# Patient Record
Sex: MALE | Race: WHITE | Employment: FULL TIME | URBAN - METROPOLITAN AREA
[De-identification: names, ages, dates, MRNs, and addresses within clinical notes are randomized per-mention and may not be internally consistent; named-entity substitution may affect disease eponyms.]

---

## 2023-04-07 NOTE — TELEPHONE ENCOUNTER
DIAGNOSIS: (L) MCL/ Dr. Guilherme Martin@ [facility]/ Medicare & BCBS/ MRI & X-RAYS     APPOINTMENT DATE: 4.17.23   NOTES STATUS DETAILS   OFFICE NOTE from other specialist Internal [facility]:  4.5.23 Guilherme Martin MD  3.13.23    3.20.23 Ximena Francois, ERIC        3.7.23 Yamel Correa, APRN-CNP     DISCHARGE REPORT from the ER Internal 3.3.23 Carlene Martinez MD  -[facility]     MEDICATION LIST Care Everywhere    MRI PACS [facility]:  3.7.23 L knee     XRAYS (IMAGES & REPORTS) PACS [facility]:  3.3.23 L knee       Action April 7, 2023 9:31 AM BH   Action Taken Faxed request xray images to [facility]. Fax [phone]  11:07 AM -- RECEIVED AND RESOLVED IMAGES IN PACS

## 2023-04-17 ENCOUNTER — OFFICE VISIT (OUTPATIENT)
Dept: ORTHOPEDICS | Facility: CLINIC | Age: 68
End: 2023-04-17
Payer: MEDICARE

## 2023-04-17 ENCOUNTER — DOCUMENTATION ONLY (OUTPATIENT)
Dept: ORTHOPEDICS | Facility: CLINIC | Age: 68
End: 2023-04-17

## 2023-04-17 ENCOUNTER — PRE VISIT (OUTPATIENT)
Dept: ORTHOPEDICS | Facility: CLINIC | Age: 68
End: 2023-04-17

## 2023-04-17 VITALS — HEIGHT: 66 IN | WEIGHT: 170 LBS | BODY MASS INDEX: 27.32 KG/M2

## 2023-04-17 DIAGNOSIS — M25.562 CHRONIC PAIN OF LEFT KNEE: Primary | ICD-10-CM

## 2023-04-17 DIAGNOSIS — G89.29 CHRONIC PAIN OF LEFT KNEE: Primary | ICD-10-CM

## 2023-04-17 PROCEDURE — 99204 OFFICE O/P NEW MOD 45 MIN: CPT | Mod: GC | Performed by: ORTHOPAEDIC SURGERY

## 2023-04-17 RX ORDER — LISINOPRIL 20 MG/1
20 TABLET ORAL
COMMUNITY
Start: 2022-12-19

## 2023-04-17 RX ORDER — ATORVASTATIN CALCIUM 20 MG/1
20 TABLET, FILM COATED ORAL
COMMUNITY
Start: 2022-12-19 | End: 2023-12-24

## 2023-04-17 ASSESSMENT — ENCOUNTER SYMPTOMS
BACK PAIN: 0
MUSCLE WEAKNESS: 0
JOINT SWELLING: 0
NECK PAIN: 0
STIFFNESS: 0
MUSCLE CRAMPS: 0
ARTHRALGIAS: 0
MYALGIAS: 0

## 2023-04-17 NOTE — PROGRESS NOTES
Patient seen and examined with the resident. I also personally reviewed the images and interpreted the imaging myself.     Assesment: High grade Left MCL tear, proximal    PCL tear    Intact ACL on examination    Plan: I had a long discussion today with the patient.  He is a very active outdoor individual and I think he probably would benefit from surgery.  He still has significant instability about his knee.  The surgical plan will be examination under anesthesia, knee arthroscopy, meniscectomy, medial collateral ligament reconstruction with allograft, posterior cruciate ligament reconstruction with allograft.    I discussed with him the pros cons risk and benefits of surgery.  We discussed the expected course of recovery and alternative treatment options.  He understands that its not a requirement though I would not expect that his knee will continue to improve any longer.  He also needs to understand that there is a risk of stiffness with surgery which I Albertina put it 10 to 15%.  I agree with history, physical and imaging as well as the assessment and plan as detailed by Dr. Crain.
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
## 2023-04-17 NOTE — PROGRESS NOTES
Procedure: MCL, PCL repair and possible ACL and meniscus surgery  Facility: [facility]  Length: 120 minutes  Anesthesia: Choice  Post-op appointments needed: 2 weeks provider only, 6 weeks with provider only.  Surgery packet/instructions given to patient?  Yes     Pre-Operative Teaching Flowsheet     Person(s) involved in teaching: Patient     Motivation Level:  Receptive (willing/able to accept information) and asks appropriate questions where applicable: Yes  Any cultural factors/Zoroastrian beliefs that may influence understanding or compliance? No     Patient demonstrates understanding of the following:  Pre-operative planning, including the necessary appointments and preparation needed prior to surgery: Yes  Which situations necessitate calling provider and whom to contact: Yes  Pain management techniques pre and post op: Yes  How, and when, to access community resources: Yes    Who will drive and stay/ with patient after surgery: Friends  Pre-op exam Brooklyn Abdulaziz  PT to be completed at [facility]         Additional Teaching Concerns Addressed:   Post-operative living arrangements and necessary adaptations to living environment.     Instructional Materials Used/Given: Yes, pre-op packet given including forms for Your surgery day, medications to stop taking before surgery, preparing for surgery, Covid-19 testing, showering before surgery, Stop light tool introduced, Opioid pain medication guideline, pre-op physical form, and map  Patient expressed understanding of all forms given, questions were answered and will review in more detail at home.     Time spent with patient: 20 minutes.

## 2023-04-18 ENCOUNTER — TELEPHONE (OUTPATIENT)
Dept: ORTHOPEDICS | Facility: CLINIC | Age: 68
End: 2023-04-18
Payer: MEDICARE

## 2023-04-18 ENCOUNTER — TELEPHONE (OUTPATIENT)
Dept: ORTHOPEDICS | Facility: CLINIC | Age: 68
End: 2023-04-18

## 2023-04-18 NOTE — TELEPHONE ENCOUNTER
Returned call to patient to schedule surgery with Dr Maldonado. I left him my direct number to call back when he is able. [phone]

## 2023-04-19 ENCOUNTER — TELEPHONE (OUTPATIENT)
Dept: ORTHOPEDICS | Facility: CLINIC | Age: 68
End: 2023-04-19
Payer: MEDICARE

## 2023-04-19 PROBLEM — G89.29 CHRONIC PAIN OF LEFT KNEE: Status: ACTIVE | Noted: 2023-04-19

## 2023-04-19 PROBLEM — M25.562 CHRONIC PAIN OF LEFT KNEE: Status: ACTIVE | Noted: 2023-04-19

## 2023-04-19 NOTE — TELEPHONE ENCOUNTER
Patient is scheduled for surgery with Dr. Maldonado    Spoke with: Patient    Date of Surgery: 5/11/23    Location: ASC    Post ops:1 & 6 weeks    Pre op with Provider: Complete    H&P: Scheduled with PCP    Additional imaging/appointments: N/A    Surgery packet: Received in clinic     Additional comments: N/A        Brandy Saucedo MA on 4/19/2023 at 11:41 AM

## 2023-04-20 DIAGNOSIS — G89.29 CHRONIC PAIN OF LEFT KNEE: Primary | ICD-10-CM

## 2023-04-20 DIAGNOSIS — M25.562 CHRONIC PAIN OF LEFT KNEE: Primary | ICD-10-CM

## 2023-04-26 ENCOUNTER — TELEPHONE (OUTPATIENT)
Dept: ORTHOPEDICS | Facility: CLINIC | Age: 68
End: 2023-04-26
Payer: MEDICARE

## 2023-04-26 NOTE — TELEPHONE ENCOUNTER
I received a call from [facility] in [place] asking for the PT protocols for this patient to be faxed over. I faxed the PT protocol for multiple ligament knee reconstruction over and called to let them know that I had faxed it. They confirmed that they received the fax.    Fredi Kang, EMT

## 2023-05-06 ENCOUNTER — HEALTH MAINTENANCE LETTER (OUTPATIENT)
Age: 68
End: 2023-05-06

## 2023-05-08 ENCOUNTER — ANESTHESIA EVENT (OUTPATIENT)
Dept: SURGERY | Facility: AMBULATORY SURGERY CENTER | Age: 68
End: 2023-05-08
Payer: MEDICARE

## 2023-05-08 RX ORDER — OXYCODONE HYDROCHLORIDE 5 MG/1
10 TABLET ORAL
Status: CANCELLED | OUTPATIENT
Start: 2023-05-08

## 2023-05-08 NOTE — ANESTHESIA PREPROCEDURE EVALUATION
Anesthesia Pre-Procedure Evaluation    Patient: Avery Anderson   MRN: [id number] : 1955        Procedure : Procedure(s):  left knee examination under anesthesia left knee posterior cruciate ligament reconstruction with allograft  medial collateral ligament reconstruction with allograft left knee  meniscus surgery left knee          No past medical history on file.   No past surgical history on file.   Allergies   Allergen Reactions     Penicillin G Rash      Social History     Tobacco Use     Smoking status: Not on file     Smokeless tobacco: Not on file   Substance Use Topics     Alcohol use: Not on file      Wt Readings from Last 1 Encounters:   23 77.1 kg (170 lb)        Anesthesia Evaluation            ROS/MED HX  ENT/Pulmonary:  - neg pulmonary ROS     Neurologic:  - neg neurologic ROS     Cardiovascular:     (+) hypertension-----    METS/Exercise Tolerance:     Hematologic:     (+) anemia,     Musculoskeletal:       GI/Hepatic:  - neg GI/hepatic ROS     Renal/Genitourinary:  - neg Renal ROS     Endo:  - neg endo ROS     Psychiatric/Substance Use:  - neg psychiatric ROS     Infectious Disease:  - neg infectious disease ROS     Malignancy:  - neg malignancy ROS     Other:  - neg other ROS          Physical Exam    Airway        Mallampati: II   TM distance: > 3 FB   Neck ROM: full   Mouth opening: > 3 cm    Respiratory Devices and Support         Dental           Cardiovascular   cardiovascular exam normal          Pulmonary   pulmonary exam normal                OUTSIDE LABS:  CBC: No results found for: WBC, HGB, HCT, PLT  BMP: No results found for: NA, POTASSIUM, CHLORIDE, CO2, BUN, CR, GLC  COAGS: No results found for: PTT, INR, FIBR  POC: No results found for: BGM, HCG, HCGS  HEPATIC: No results found for: ALBUMIN, PROTTOTAL, ALT, AST, GGT, ALKPHOS, BILITOTAL, BILIDIRECT, JALIL  OTHER: No results found for: PH, LACT, A1C, YOSELIN, PHOS, MAG, LIPASE, AMYLASE, TSH, T4, T3, CRP, SED    Anesthesia  Plan    ASA Status:  2   NPO Status:  NPO Appropriate    Anesthesia Type: General.     - Airway: LMA   Induction: Intravenous, Propofol.   Maintenance: TIVA.        Consents    Anesthesia Plan(s) and associated risks, benefits, and realistic alternatives discussed. Questions answered and patient/representative(s) expressed understanding.    - Discussed:     - Discussed with:  Patient      - Extended Intubation/Ventilatory Support Discussed: No.      - Patient is DNR/DNI Status: No    Use of blood products discussed: No .     Postoperative Care    Pain management: Multi-modal analgesia, IV analgesics, Oral pain medications, Peripheral nerve block (Single Shot).   PONV prophylaxis: Ondansetron (or other 5HT-3), Dexamethasone or Solumedrol, Background Propofol Infusion     Comments:                Joe Zambrano, DO

## 2023-05-11 ENCOUNTER — ANESTHESIA (OUTPATIENT)
Dept: SURGERY | Facility: AMBULATORY SURGERY CENTER | Age: 68
End: 2023-05-11
Payer: MEDICARE

## 2023-05-11 ENCOUNTER — HOSPITAL ENCOUNTER (OUTPATIENT)
Facility: AMBULATORY SURGERY CENTER | Age: 68
Discharge: HOME | End: 2023-05-11
Attending: ORTHOPAEDIC SURGERY
Payer: MEDICARE

## 2023-05-11 ENCOUNTER — ANCILLARY PROCEDURE (OUTPATIENT)
Dept: RADIOLOGY | Facility: AMBULATORY SURGERY CENTER | Age: 68
End: 2023-05-11
Attending: ORTHOPAEDIC SURGERY
Payer: MEDICARE

## 2023-05-11 VITALS
RESPIRATION RATE: 16 BRPM | HEIGHT: 66 IN | OXYGEN SATURATION: 94 % | WEIGHT: 170 LBS | HEART RATE: 65 BPM | SYSTOLIC BLOOD PRESSURE: 128 MMHG | TEMPERATURE: 97.3 F | DIASTOLIC BLOOD PRESSURE: 78 MMHG | BODY MASS INDEX: 27.32 KG/M2

## 2023-05-11 DIAGNOSIS — G89.29 CHRONIC PAIN OF LEFT KNEE: ICD-10-CM

## 2023-05-11 DIAGNOSIS — M25.562 CHRONIC PAIN OF LEFT KNEE: ICD-10-CM

## 2023-05-11 PROCEDURE — C9290 INJ, BUPIVACAINE LIPOSOME: HCPCS

## 2023-05-11 PROCEDURE — 27427 RECONSTRUCTION KNEE: CPT | Mod: 59,LT

## 2023-05-11 PROCEDURE — 29881 ARTHRS KNE SRG MNISECTMY M/L: CPT | Mod: LT

## 2023-05-11 PROCEDURE — 29889 ARTHRS AID PCL RPR/AGMNTJ: CPT | Mod: LT | Performed by: ORTHOPAEDIC SURGERY

## 2023-05-11 PROCEDURE — 29889 ARTHRS AID PCL RPR/AGMNTJ: CPT | Mod: LT

## 2023-05-11 PROCEDURE — C1713 ANCHOR/SCREW BN/BN,TIS/BN: HCPCS

## 2023-05-11 PROCEDURE — C1762 CONN TISS, HUMAN(INC FASCIA): HCPCS | Mod: ZZSP

## 2023-05-11 PROCEDURE — 27427 RECONSTRUCTION KNEE: CPT | Mod: 59 | Performed by: ORTHOPAEDIC SURGERY

## 2023-05-11 PROCEDURE — 29881 ARTHRS KNE SRG MNISECTMY M/L: CPT | Mod: LT | Performed by: ORTHOPAEDIC SURGERY

## 2023-05-11 DEVICE — BIO-COMP SWIVELOCK C, CLD 5.5X19.1MM
Type: IMPLANTABLE DEVICE | Site: KNEE | Status: FUNCTIONAL
Brand: ARTHREX®

## 2023-05-11 DEVICE — IMPLANTABLE DEVICE: Type: IMPLANTABLE DEVICE | Site: KNEE | Status: FUNCTIONAL

## 2023-05-11 DEVICE — GRAFT TENDON SEMITENDINOSUS 26CM 430355: Type: IMPLANTABLE DEVICE | Site: KNEE | Status: FUNCTIONAL

## 2023-05-11 DEVICE — TIGHTROPE ® II RT WITH DEPLOYING SUTURE
Type: IMPLANTABLE DEVICE | Site: KNEE | Status: FUNCTIONAL
Brand: ARTHREX®

## 2023-05-11 DEVICE — BI-CORTICAL POST
Type: IMPLANTABLE DEVICE | Site: KNEE | Status: FUNCTIONAL
Brand: ARTHREX®

## 2023-05-11 DEVICE — TIGHTROPE ABS BUTTON ROUND 11MM CONCAVE
Type: IMPLANTABLE DEVICE | Site: KNEE | Status: FUNCTIONAL
Brand: ARTHREX®

## 2023-05-11 RX ORDER — NALOXONE HYDROCHLORIDE 0.4 MG/ML
0.4 INJECTION, SOLUTION INTRAMUSCULAR; INTRAVENOUS; SUBCUTANEOUS
Status: DISCONTINUED | OUTPATIENT
Start: 2023-05-11 | End: 2023-05-12 | Stop reason: HOSPADM

## 2023-05-11 RX ORDER — PROPOFOL 10 MG/ML
INJECTION, EMULSION INTRAVENOUS CONTINUOUS PRN
Status: DISCONTINUED | OUTPATIENT
Start: 2023-05-11 | End: 2023-05-11

## 2023-05-11 RX ORDER — ACETAMINOPHEN 325 MG/1
975 TABLET ORAL ONCE
Status: COMPLETED | OUTPATIENT
Start: 2023-05-11 | End: 2023-05-11

## 2023-05-11 RX ORDER — FLUMAZENIL 0.1 MG/ML
0.2 INJECTION, SOLUTION INTRAVENOUS
Status: DISCONTINUED | OUTPATIENT
Start: 2023-05-11 | End: 2023-05-12 | Stop reason: HOSPADM

## 2023-05-11 RX ORDER — LIDOCAINE HYDROCHLORIDE 20 MG/ML
INJECTION, SOLUTION INFILTRATION; PERINEURAL PRN
Status: DISCONTINUED | OUTPATIENT
Start: 2023-05-11 | End: 2023-05-11

## 2023-05-11 RX ORDER — CEFAZOLIN SODIUM 2 G/50ML
2 SOLUTION INTRAVENOUS SEE ADMIN INSTRUCTIONS
Status: DISCONTINUED | OUTPATIENT
Start: 2023-05-11 | End: 2023-05-12 | Stop reason: HOSPADM

## 2023-05-11 RX ORDER — FENTANYL CITRATE 50 UG/ML
25 INJECTION, SOLUTION INTRAMUSCULAR; INTRAVENOUS EVERY 5 MIN PRN
Status: DISCONTINUED | OUTPATIENT
Start: 2023-05-11 | End: 2023-05-12 | Stop reason: HOSPADM

## 2023-05-11 RX ORDER — CEFAZOLIN SODIUM 2 G/50ML
2 SOLUTION INTRAVENOUS
Status: COMPLETED | OUTPATIENT
Start: 2023-05-11 | End: 2023-05-11

## 2023-05-11 RX ORDER — NALOXONE HYDROCHLORIDE 0.4 MG/ML
0.2 INJECTION, SOLUTION INTRAMUSCULAR; INTRAVENOUS; SUBCUTANEOUS
Status: DISCONTINUED | OUTPATIENT
Start: 2023-05-11 | End: 2023-05-12 | Stop reason: HOSPADM

## 2023-05-11 RX ORDER — HYDROXYZINE HYDROCHLORIDE 25 MG/1
25 TABLET, FILM COATED ORAL
Status: DISCONTINUED | OUTPATIENT
Start: 2023-05-11 | End: 2023-05-12 | Stop reason: HOSPADM

## 2023-05-11 RX ORDER — PROPOFOL 10 MG/ML
INJECTION, EMULSION INTRAVENOUS PRN
Status: DISCONTINUED | OUTPATIENT
Start: 2023-05-11 | End: 2023-05-11

## 2023-05-11 RX ORDER — HYDROMORPHONE HYDROCHLORIDE 1 MG/ML
0.4 INJECTION, SOLUTION INTRAMUSCULAR; INTRAVENOUS; SUBCUTANEOUS EVERY 5 MIN PRN
Status: DISCONTINUED | OUTPATIENT
Start: 2023-05-11 | End: 2023-05-12 | Stop reason: HOSPADM

## 2023-05-11 RX ORDER — ACETAMINOPHEN 325 MG/1
975 TABLET ORAL ONCE
Status: DISCONTINUED | OUTPATIENT
Start: 2023-05-11 | End: 2023-05-12 | Stop reason: HOSPADM

## 2023-05-11 RX ORDER — ONDANSETRON 2 MG/ML
INJECTION INTRAMUSCULAR; INTRAVENOUS PRN
Status: DISCONTINUED | OUTPATIENT
Start: 2023-05-11 | End: 2023-05-11

## 2023-05-11 RX ORDER — ONDANSETRON 2 MG/ML
4 INJECTION INTRAMUSCULAR; INTRAVENOUS EVERY 30 MIN PRN
Status: DISCONTINUED | OUTPATIENT
Start: 2023-05-11 | End: 2023-05-12 | Stop reason: HOSPADM

## 2023-05-11 RX ORDER — DEXAMETHASONE SODIUM PHOSPHATE 4 MG/ML
INJECTION, SOLUTION INTRA-ARTICULAR; INTRALESIONAL; INTRAMUSCULAR; INTRAVENOUS; SOFT TISSUE PRN
Status: DISCONTINUED | OUTPATIENT
Start: 2023-05-11 | End: 2023-05-11

## 2023-05-11 RX ORDER — LIDOCAINE 40 MG/G
CREAM TOPICAL
Status: DISCONTINUED | OUTPATIENT
Start: 2023-05-11 | End: 2023-05-12 | Stop reason: HOSPADM

## 2023-05-11 RX ORDER — SODIUM CHLORIDE, SODIUM LACTATE, POTASSIUM CHLORIDE, CALCIUM CHLORIDE 600; 310; 30; 20 MG/100ML; MG/100ML; MG/100ML; MG/100ML
INJECTION, SOLUTION INTRAVENOUS CONTINUOUS
Status: DISCONTINUED | OUTPATIENT
Start: 2023-05-11 | End: 2023-05-12 | Stop reason: HOSPADM

## 2023-05-11 RX ORDER — OXYCODONE HYDROCHLORIDE 5 MG/1
5-10 TABLET ORAL EVERY 4 HOURS PRN
Qty: 20 TABLET | Refills: 0 | Status: SHIPPED | OUTPATIENT
Start: 2023-05-11

## 2023-05-11 RX ORDER — HYDROMORPHONE HYDROCHLORIDE 1 MG/ML
0.2 INJECTION, SOLUTION INTRAMUSCULAR; INTRAVENOUS; SUBCUTANEOUS EVERY 5 MIN PRN
Status: DISCONTINUED | OUTPATIENT
Start: 2023-05-11 | End: 2023-05-12 | Stop reason: HOSPADM

## 2023-05-11 RX ORDER — ACETAMINOPHEN 325 MG/1
650 TABLET ORAL EVERY 4 HOURS PRN
Qty: 50 TABLET | Refills: 0 | Status: SHIPPED | OUTPATIENT
Start: 2023-05-11

## 2023-05-11 RX ORDER — FENTANYL CITRATE 50 UG/ML
25-50 INJECTION, SOLUTION INTRAMUSCULAR; INTRAVENOUS
Status: DISCONTINUED | OUTPATIENT
Start: 2023-05-11 | End: 2023-05-12 | Stop reason: HOSPADM

## 2023-05-11 RX ORDER — ONDANSETRON 4 MG/1
4 TABLET, ORALLY DISINTEGRATING ORAL EVERY 30 MIN PRN
Status: DISCONTINUED | OUTPATIENT
Start: 2023-05-11 | End: 2023-05-12 | Stop reason: HOSPADM

## 2023-05-11 RX ORDER — OXYCODONE HYDROCHLORIDE 5 MG/1
5 TABLET ORAL
Status: COMPLETED | OUTPATIENT
Start: 2023-05-11 | End: 2023-05-11

## 2023-05-11 RX ORDER — BUPIVACAINE HYDROCHLORIDE AND EPINEPHRINE 2.5; 5 MG/ML; UG/ML
INJECTION, SOLUTION INFILTRATION; PERINEURAL PRN
Status: DISCONTINUED | OUTPATIENT
Start: 2023-05-11 | End: 2023-05-11 | Stop reason: HOSPADM

## 2023-05-11 RX ORDER — BUPIVACAINE HYDROCHLORIDE 2.5 MG/ML
INJECTION, SOLUTION EPIDURAL; INFILTRATION; INTRACAUDAL
Status: COMPLETED | OUTPATIENT
Start: 2023-05-11 | End: 2023-05-11

## 2023-05-11 RX ORDER — GLYCOPYRROLATE 0.2 MG/ML
INJECTION, SOLUTION INTRAMUSCULAR; INTRAVENOUS PRN
Status: DISCONTINUED | OUTPATIENT
Start: 2023-05-11 | End: 2023-05-11

## 2023-05-11 RX ORDER — ONDANSETRON 4 MG/1
4 TABLET, ORALLY DISINTEGRATING ORAL EVERY 8 HOURS PRN
Qty: 4 TABLET | Refills: 0 | Status: SHIPPED | OUTPATIENT
Start: 2023-05-11

## 2023-05-11 RX ORDER — FENTANYL CITRATE 50 UG/ML
50 INJECTION, SOLUTION INTRAMUSCULAR; INTRAVENOUS EVERY 5 MIN PRN
Status: DISCONTINUED | OUTPATIENT
Start: 2023-05-11 | End: 2023-05-12 | Stop reason: HOSPADM

## 2023-05-11 RX ORDER — LABETALOL HYDROCHLORIDE 5 MG/ML
10 INJECTION, SOLUTION INTRAVENOUS
Status: DISCONTINUED | OUTPATIENT
Start: 2023-05-11 | End: 2023-05-12 | Stop reason: HOSPADM

## 2023-05-11 RX ORDER — ONDANSETRON 4 MG/1
4 TABLET, ORALLY DISINTEGRATING ORAL
Status: DISCONTINUED | OUTPATIENT
Start: 2023-05-11 | End: 2023-05-12 | Stop reason: HOSPADM

## 2023-05-11 RX ORDER — KETOROLAC TROMETHAMINE 30 MG/ML
INJECTION, SOLUTION INTRAMUSCULAR; INTRAVENOUS PRN
Status: DISCONTINUED | OUTPATIENT
Start: 2023-05-11 | End: 2023-05-11

## 2023-05-11 RX ORDER — AMOXICILLIN 250 MG
1-2 CAPSULE ORAL 2 TIMES DAILY
Qty: 30 TABLET | Refills: 0 | Status: SHIPPED | OUTPATIENT
Start: 2023-05-11

## 2023-05-11 RX ORDER — OXYCODONE HYDROCHLORIDE 5 MG/1
5 TABLET ORAL
Status: DISCONTINUED | OUTPATIENT
Start: 2023-05-11 | End: 2023-05-12 | Stop reason: HOSPADM

## 2023-05-11 RX ORDER — DEXMEDETOMIDINE HYDROCHLORIDE 4 UG/ML
INJECTION, SOLUTION INTRAVENOUS PRN
Status: DISCONTINUED | OUTPATIENT
Start: 2023-05-11 | End: 2023-05-11

## 2023-05-11 RX ORDER — ACETAMINOPHEN 325 MG/1
650 TABLET ORAL
Status: DISCONTINUED | OUTPATIENT
Start: 2023-05-11 | End: 2023-05-12 | Stop reason: HOSPADM

## 2023-05-11 RX ADMIN — PROPOFOL 150 MCG/KG/MIN: 10 INJECTION, EMULSION INTRAVENOUS at 07:21

## 2023-05-11 RX ADMIN — GLYCOPYRROLATE 0.2 MG: 0.2 INJECTION, SOLUTION INTRAMUSCULAR; INTRAVENOUS at 08:32

## 2023-05-11 RX ADMIN — DEXAMETHASONE SODIUM PHOSPHATE 4 MG: 4 INJECTION, SOLUTION INTRA-ARTICULAR; INTRALESIONAL; INTRAMUSCULAR; INTRAVENOUS; SOFT TISSUE at 07:21

## 2023-05-11 RX ADMIN — KETOROLAC TROMETHAMINE 30 MG: 30 INJECTION, SOLUTION INTRAMUSCULAR; INTRAVENOUS at 10:07

## 2023-05-11 RX ADMIN — Medication 0.5 MG: at 07:50

## 2023-05-11 RX ADMIN — Medication 100 MCG: at 08:58

## 2023-05-11 RX ADMIN — Medication 100 MCG: at 07:58

## 2023-05-11 RX ADMIN — CEFAZOLIN SODIUM 2 G: 2 SOLUTION INTRAVENOUS at 07:29

## 2023-05-11 RX ADMIN — Medication 100 MCG: at 07:46

## 2023-05-11 RX ADMIN — Medication 100 MCG: at 08:42

## 2023-05-11 RX ADMIN — Medication 100 MCG: at 08:40

## 2023-05-11 RX ADMIN — DEXMEDETOMIDINE HYDROCHLORIDE 4 MCG: 4 INJECTION, SOLUTION INTRAVENOUS at 08:34

## 2023-05-11 RX ADMIN — Medication 100 MCG: at 08:27

## 2023-05-11 RX ADMIN — SODIUM CHLORIDE, SODIUM LACTATE, POTASSIUM CHLORIDE, CALCIUM CHLORIDE: 600; 310; 30; 20 INJECTION, SOLUTION INTRAVENOUS at 08:35

## 2023-05-11 RX ADMIN — FENTANYL CITRATE 50 MCG: 50 INJECTION, SOLUTION INTRAMUSCULAR; INTRAVENOUS at 06:51

## 2023-05-11 RX ADMIN — GLYCOPYRROLATE 0.2 MG: 0.2 INJECTION, SOLUTION INTRAMUSCULAR; INTRAVENOUS at 07:30

## 2023-05-11 RX ADMIN — Medication 100 MCG: at 08:09

## 2023-05-11 RX ADMIN — ACETAMINOPHEN 975 MG: 325 TABLET ORAL at 06:39

## 2023-05-11 RX ADMIN — ONDANSETRON 4 MG: 2 INJECTION INTRAMUSCULAR; INTRAVENOUS at 07:30

## 2023-05-11 RX ADMIN — SODIUM CHLORIDE, SODIUM LACTATE, POTASSIUM CHLORIDE, CALCIUM CHLORIDE: 600; 310; 30; 20 INJECTION, SOLUTION INTRAVENOUS at 06:39

## 2023-05-11 RX ADMIN — Medication 0.5 MCG/KG/MIN: at 08:58

## 2023-05-11 RX ADMIN — OXYCODONE HYDROCHLORIDE 5 MG: 5 TABLET ORAL at 10:51

## 2023-05-11 RX ADMIN — FENTANYL CITRATE 50 MCG: 50 INJECTION, SOLUTION INTRAMUSCULAR; INTRAVENOUS at 07:30

## 2023-05-11 RX ADMIN — PROPOFOL 200 MG: 10 INJECTION, EMULSION INTRAVENOUS at 07:21

## 2023-05-11 RX ADMIN — LIDOCAINE HYDROCHLORIDE 100 MG: 20 INJECTION, SOLUTION INFILTRATION; PERINEURAL at 07:21

## 2023-05-11 RX ADMIN — FENTANYL CITRATE 50 MCG: 50 INJECTION, SOLUTION INTRAMUSCULAR; INTRAVENOUS at 10:42

## 2023-05-11 RX ADMIN — DEXMEDETOMIDINE HYDROCHLORIDE 8 MCG: 4 INJECTION, SOLUTION INTRAVENOUS at 08:32

## 2023-05-11 RX ADMIN — Medication 100 MCG: at 08:56

## 2023-05-11 RX ADMIN — Medication 100 MCG: at 08:12

## 2023-05-11 RX ADMIN — Medication 100 MCG: at 08:37

## 2023-05-11 RX ADMIN — BUPIVACAINE HYDROCHLORIDE 10 ML: 2.5 INJECTION, SOLUTION EPIDURAL; INFILTRATION; INTRACAUDAL at 06:50

## 2023-05-11 RX ADMIN — Medication 100 MCG: at 08:47

## 2023-05-11 RX ADMIN — Medication 100 MCG: at 09:00

## 2023-05-11 RX ADMIN — FENTANYL CITRATE 50 MCG: 50 INJECTION, SOLUTION INTRAMUSCULAR; INTRAVENOUS at 07:21

## 2023-05-11 NOTE — ANESTHESIA PROCEDURE NOTES
Adductor canal Procedure Note    Pre-Procedure   Staff -        Anesthesiologist:  Louis Jovel MD       Resident/Fellow: Joe Zambrano DO       Performed By: resident       Location: pre-op       Procedure Start/Stop Times: 5/11/2023 6:50 AM and 5/11/2023 7:00 AM       Pre-Anesthestic Checklist: patient identified, IV checked, site marked, risks and benefits discussed, informed consent, monitors and equipment checked, pre-op evaluation, at physician/surgeon's request and post-op pain management  Timeout:       Correct Patient: Yes        Correct Procedure: Yes        Correct Site: Yes        Correct Position: Yes        Correct Laterality: Yes        Site Marked: Yes  Procedure Documentation  Procedure: Adductor canal       Diagnosis: POST OPERATIVE PAIN       Laterality: left       Patient Position: supine       Patient Prep/Sterile Barriers: sterile gloves, mask       Skin prep: Chloraprep       Needle Type: short bevel       Needle Gauge: 21.        Needle Length (millimeters): 110        Ultrasound guided       1. Ultrasound was used to identify targeted nerve, plexus, vascular marker, or fascial plane and place a needle adjacent to it in real-time.       2. Ultrasound was used to visualize the spread of anesthetic in close proximity to the above referenced structure.       3. A permanent image is entered into the patient's record.    Assessment/Narrative         The placement was negative for: blood aspirated, painful injection and site bleeding       Paresthesias: No.       Bolus given via needle..        Secured via.        Insertion/Infusion Method: Single Shot       Complications: none       Injection made incrementally with aspirations every 5 mL.    Medication(s) Administered   Bupivacaine 0.25% PF (Infiltration) - Infiltration   10 mL - 5/11/2023 6:50:00 AM  Bupivacaine liposome (Exparel) 1.3% LA inj susp (Infiltration) - Infiltration   10 mL - 5/11/2023 6:50:00 AM  Medication Administration  "Time: 5/11/2023 6:50 AM     Comments:  133mg liposomal bupivacaine      FOR [facility] ONLY:   Pain Team Contact information: please page the Pain Team Via [employer]. Search \"Pain\". During daytime hours, please page the attending first. At night please page the resident first.      "

## 2023-05-11 NOTE — DISCHARGE INSTRUCTIONS
"[facility]  Home Care Following Anesthesia  For 24 hours after surgery:  Get plenty of rest.  A responsible adult must stay with you for at least 24 hours after you leave the surgery center.  Do not drive or use heavy equipment.  If you have weakness or tingling, don't drive or use heavy equipment until this feeling goes away.   Do not drink alcohol.   Avoid strenuous or risky activities.  Ask for help when climbing stairs.  You may feel lightheaded.  IF so, sit for a few minutes before standing.  Have someone help you get up.   If you have nausea (feel sick to your stomach): Drink only clear liquids such as apple juice, ginger ale, broth or 7-Up.  Rest may also help.  Be sure to drink enough fluids.  Move to a regular diet as you feel able.   You may have a slight fever.  Call the doctor if your fever is over 100 F (37.7 C) (taken under the tongue) or lasts longer than 24 hours.  You may have a dry mouth, a sore throat, muscle aches or trouble sleeping. These should go away after 24 hours.  Do not make important or legal decisions.   It is recommended to avoid smoking.        Today you received an Exparel block to numb the nerves near your surgery site.  This is a block using local anesthetic or \"numbing\" medication injected around the nerves to anesthetize or \"numb\" the area supplied by those nerves.  This block is injected into the muscle layer near your surgical site.  This medication may numb the location where you had surgery up to 72 hours.  If your surgical site is an arm or leg you should be careful with your affected limb, since it is possible to injure your limb without being aware of it due to the numbing.  Until full feeling returns, you should guard against bumping or hitting your limb, and avoid extreme hot or cold temperatures on the skin.  As the block wears off, the feeling will return as a tingling or prickly sensation near your surgical site.  You will " experince more discomfort from your incision as the feeling returns.  You may want to take a pain pill (a narcotic or Tylenol if this was prescribed by your surgeon) when you start to experience mild pain before the pain beomes more severe.  If your pain medications do not control your pain, you should notify your surgeon.    Tips for taking pain medications  To get the best pain relief possible, remember these points:  Take pain medications as directed, before pain becomes severe.  Pain medication can upset your stomach: taking it with food may help.  Constipation is a common side effect of pain medication. Drink plenty of  fluids.  Eat foods high in fiber. Take a stool softener if recommended by your doctor or pharmacist.  Do not drink alcohol, drive or operate machinery while taking pain medications.  Ask about other ways to control pain, such as with heat, ice or relaxation.    Tylenol/Acetaminophen Consumption  To help encourage the safe use of acetaminophen, the makers of TYLENOL  have lowered the maximum daily dose for single-ingredient Extra Strength TYLENOL  (acetaminophen) products sold in the [place] from 8 pills per day (4,000 mg) to 6 pills per day (3,000 mg). The dosing interval has also changed from 2 pills every 4-6 hours to 2 pills every 6 hours.  If you feel your pain relief is insufficient, you may take Tylenol/Acetaminophen in addition to your narcotic pain medication.   Be careful not to exceed 3,000 mg of Tylenol/Acetaminophen in a 24 hour period from all sources.  If you are taking extra strength Tylenol/acetaminophen (500 mg), the maximum dose is 6 tablets in 24 hours.  You received 975 mg of Tylenol at 6:39 AM today. Next dose is available at 12:39 PM as needed per package instruction.  You received 30 mg of Toradol at 10:07 AM today. No NSAID'S before 4:07 PM as needed per package instruction.  If you are taking regular strength acetaminophen (325 mg), the maximum dose is 9 tablets in 24  "hours.    Call a doctor for any of the following:  Signs of infection (fever, growing tenderness at the surgery site, a large amount of drainage or bleeding, severe pain, foul-smelling drainage, redness, swelling).  It has been over 8 to 10 hours since surgery and you are still not able to urinate (pass water).  Headache for over 24 hours.  Numbness, tingling or weakness the day after surgery (if you had spinal anesthesia).  Signs of Covid-19 infection (temperature over 100 degrees, shortness of breath, cough, loss of taste/smell, generalized body aches, persistent headache, chills, sore throat, nausea/vomiting/diarrhea)  Your doctor is:       Dr. Leroy Maldonado, Orthopaedics: [phone]               Or dial [phone] and ask for the resident on call for:  Orthopaedics  For emergency care, call the:  [facility] Emergency Department: [phone] (TTY for hearing impaired: [phone])      Safety Tips for Using Crutches    Crutch Fit:  Assume good standing posture with shoulders relaxed and crutch tips 6-8 inches out from the side of the foot.  The underarm pad should fall 2-3 fingers width below the armpit.  The handgrip is positioned level with the wrist to allow 30  flexion at the elbow.    Safety Tips:  Bear weight on your hands, not on your armpits.  Do not add extra padding to the underarm pad. This will, in effect, lengthen the crutches and increase risk of nerve injury.  Wear flat, properly fitting shoes. Do not walk in stocking feet, high heels or slippers.  Household hazards:  --Throw rugs should be removed from floors.  --Stairs should be cleared of obstacles.  --Use extra caution on slippery, highly polished, littered or uneven floor surfaces.  --Check for electric cords.  Check crutch tips for excessive wear and keep wing nuts tight.  While walking, look forward with  head up  and  eyes open.  Take equal length steps.  Use BOTH crutches.    Stairs Sequence:  UP: \"Good\" leg first, followed by " " bad  leg, then crutches.  DOWN: Crutches, followed by  bad  leg, \"good\" leg.     Walking with Crutches:  Move both crutches forward at the same time.  Non-Weight Bearing (NWB):  Hold the involved leg up and swing through the crutches with the involved leg. The involved leg does not touch the floor.  Toe Touch Weight Bearing (TTWB): Move the involved leg forward. Rest it lightly on the floor for balance only. Step through the crutches with the uninvolved leg.  Partial Weight Bearing (PWB): Move the involved leg forward. Step down the weight of the leg only.  Step through the crutches with the uninvolved leg.  Weight Bearing As Tolerated (WBAT): Move the involved leg forward. Put as much pressure through the involved leg as you can tolerate comfortably. Then step through the crutches with the uninvolved leg.                "

## 2023-05-11 NOTE — ANESTHESIA POSTPROCEDURE EVALUATION
Patient: Avery Anderson    Procedure: Procedure(s):  left knee examination under anesthesia left knee posterior cruciate ligament reconstruction with allograft  medial collateral ligament reconstruction with allograft left knee       Anesthesia Type:  General    Note:  Disposition: Outpatient   Postop Pain Control: Uneventful            Sign Out: Well controlled pain   PONV: No   Neuro/Psych: Uneventful            Sign Out: Acceptable/Baseline neuro status   Airway/Respiratory: Uneventful            Sign Out: Acceptable/Baseline resp. status   CV/Hemodynamics: Uneventful            Sign Out: Acceptable CV status; No obvious hypovolemia; No obvious fluid overload   Other NRE:    DID A NON-ROUTINE EVENT OCCUR?     Event details/Postop Comments:  Slow recoverY to maintain saturations over 92%           Last vitals:  Vitals Value Taken Time   /80 05/11/23 1100   Temp 36.3  C (97.4  F) 05/11/23 1100   Pulse 71 05/11/23 1100   Resp 17 05/11/23 1100   SpO2 94 % 05/11/23 1100   Vitals shown include unvalidated device data.    Electronically Signed By: Louis Jovel MD  May 11, 2023  12:54 PM

## 2023-05-11 NOTE — OP NOTE
PREOPERATIVE DIAGNOSIS:   Left PCL tear  Left MCL tear    POSTOPERATIVE DIAGNOSIS:  Grade 3 rupture left posterior cruciate ligament  Grade 3 rupture left medial collateral ligament  Arthrofibrosis left knee  Free edge tearing of the lateral meniscus    PROCEDURE:  Examination under anesthesia left knee  Left knee arthroscopy  Posterior cruciate ligament reconstruction with allograft  Medial collateral ligament reconstruction with allograft  Partial lateral meniscectomy  Manipulation under anesthesia    DATE OF SURGERY: 5/11/2023    SURGEON: Leroy Maldonado MD    ASSISTANT: None.     RESIDENT OR FELLOW: Miky Rivas MD    OPERATIVE INDICATIONS: Avery Anderson is a pleasant 67 year old who I saw through my orthopedic clinic with a history, physical, imaging consistent with a high-grade injury to the posterior cruciate ligament and medial collateral ligament.  This is a multiligament knee injury.  We discussed surgical reconstruction.  We discussed the use of allograft tissue.  We discussed that this is a life-changing injury.  He understood the risk of stiffness approach 20%.  We discussed the limitations of surgery.  As well as the importance of a postsurgical rehabilitation program.  He understood these risks and desired to proceed..  I reviewed with the patient the risks, benefits, complications, techniques and alternatives to surgery.  We reviewed the expected course of recovery and the potential expected outcomes.  The patient understood both the risks and benefits and desired to proceed despite the risks.    OPERATIVE DETAILS: In the preoperative area the patient's informed consent was reviewed and they desired to proceed.  The left leg was marked and the patient was in agreement.  The patient was taken to the operating room where a timeout was performed and all parties were in agreement.  Preoperative antibiotics were given within 1 hour of the time of incision.  The patient was placed in the supine  position and surrendered to LMA anesthesia.  No tourniquet was applied.  Egg crate was placed beneath the well leg and a side post was utilized.  The operative leg was prepped and draped in the usual sterile fashion.     Examination Under Anesthesia: Range of motion was 0 to 100 degrees.  He did have some stiffness in his knee and a gentle manipulation under anesthesia was performed at the beginning of the case this improved his motion from 0 degrees to 130 degrees and equal to the contralateral side.  At this time examination showed valgus instability.  3+30 degrees 2+ at 0 degrees.  No varus instability.  2+ posterior drawer.  Soft endpoint.  Lachman 0.  No pivot shift.  Positive pseudo Lachman demonstrating failure of his PCL at this time given his grossly unstable knee elected proceed with surgical reconstruction.    Graft preparation: A 3 suture peroneus longus allograft was thawed and buttons were applied to bone and consistent with our graft link technique.  The final graft dimensions measured 85 x 11.5 mm.  It was tensioned at 20 pounds for 20 minutes to remove any creep from the graft.  This was placed under antibiotic impregnated saline.    For our MCL reconstruction running locking fiber loop was placed on each tail and the graft was doubled over a Arthrex tight rope.  The loop was elongated.  It was seen to fit through a size 8 on the femoral side and tibial side.  It was also tensioned for 20 pounds for least 20 minutes to remove any creep.  And a circumferential suture was placed 2 cm from the fold of the graft.    Anterior medial and anterolateral arthroscopic portals were created and a diagnostic arthroscopy was performed with the following findings: Medial femoral condyle showed grade 2 change.  Medial tibial plateau grade 2 change.  Medial meniscus intact.  PCL completely deficient.  ACL intact.  Positive arthroscopic pseudo Lachman.  Lateral femoral condyle lateral tibial plateau was normal in  appearance without significant cartilage wear.  Free edge tearing/horizontal tearing of the mid body and posterior horn of the lateral meniscus.  Medial patella facet central ridge lateral patella facet central trochlea.  Normal.      At this time a partial lateral meniscectomy was complete until about stable rim of meniscus remained    At this time we began to prepare for the intra-articular drilling of our tunnels.  To do this debridement we first exposed the femoral origin of the posterior cruciate ligament marked our spot with a thermal device.  Then remaining nonfunctional PCL tissue was debrided.  We established a posterior medial portal under spinal needle localization continued our dissection on the posterior aspect of the tibia.  At this time our PCL guide was placed and we confirmed its position on C arm intraoperative imaging.  A flip cutter was advanced under fluoroscopic control.  Our osseous length measured 60 and we reamed a 11.5 socket.  Passing suture was placed.  Then utilizing outside in guide our femoral tunnel was drilled and the osseous length measured 32 and we reamed a 25 mm socket.  This time our graft was brought up and reduced through the medial portal into the tibial tunnel.  The cortical button was then deployed over the superior medial cortex.  Tensioning and retention was performed and the graft was reduced into the femoral tunnel.  With the knee at 90 degrees against the intact ACL our ABS button was then assembled and tensioning and retention was performed on both sides.  This completely resolved the posterior drawer test and there was no further instability.  Full range of motion was present.    Under fluoroscopic control we then identified the femoral insertion of her medial collateral ligament.  A spade tip pin was placed.  We reamed a 30 mm socket.  With a bent alem retractor on the tibial side a graft was brought up on the field the cortical button was deployed over the  lateral cortex and 20 mm of graft was reduced to the femoral tunnel.  Then with the knee at neutral rotation 30 degrees of flexion.  Maximum varus a spike ligament washer was placed providing excellent compression onto the tibial insertion of the medial collateral ligament.  We then returned to the femoral side and tensioning and retention was again performed.  Final images showed good position of the hardware.    Copious irrigation was performed an a layered closure was initiated, sterile dressings were applied and the patient was transferred to the recovery room in stable condition with stable vital signs.    ESTIMATED BLOOD LOSS: 25 mL.    TOURNIQUET TIME: No tourniquet was placed.    COMPLICATIONS: None apparent.    DRAINS: None.    SPECIMENS: None.     POSTOPERATIVE PLAN:  Patient be allowed to discharge home today  Toe-touch weightbearing left lower extremity x4 weeks then progressive weightbearing as tolerated between 4 and 6 weeks  No motion for 1 week then range of motion 0 to 90 degrees when sitting or doing therapy though I do want the brace on and locked when up and around until at least 4 weeks  Between weeks 4-6 keep can begin weightbearing as tolerated and motion as tolerated in controlled settings  Aspirin 162 mg daily for DVT prophylaxis x4 weeks  No running until least 3 to 4 months.  No sports for approximately 8 months

## 2023-05-11 NOTE — OR NURSING
Patient was desaturating to high 80s in phase 2. RN assisted patient with deep breathing and coughing and using IS. Patient was switched to using ear probe and kept saturations between 90 to 95%. Dr. Jovel was notified and ok'd patient to be discharged.

## 2023-05-11 NOTE — ANESTHESIA CARE TRANSFER NOTE
Patient: Avery Anderson    Procedure: Procedure(s):  left knee examination under anesthesia left knee posterior cruciate ligament reconstruction with allograft  medial collateral ligament reconstruction with allograft left knee       Diagnosis: Chronic pain of left knee [M25.562, G89.29]  Diagnosis Additional Information: No value filed.    Anesthesia Type:   General     Note:    Oropharynx: oropharynx clear of all foreign objects  Level of Consciousness: awake  Oxygen Supplementation: face mask    Independent Airway: airway patency satisfactory and stable  Dentition: dentition unchanged  Vital Signs Stable: post-procedure vital signs reviewed and stable  Report to RN Given: handoff report given  Patient transferred to: PACU    Handoff Report: Identifed the Patient, Identified the Reponsible Provider, Reviewed the pertinent medical history, Discussed the surgical course, Reviewed Intra-OP anesthesia mangement and issues during anesthesia, Set expectations for post-procedure period and Allowed opportunity for questions and acknowledgement of understanding      Vitals:  Vitals Value Taken Time   /84 05/11/23 1024   Temp     Pulse 75 05/11/23 1026   Resp 22 05/11/23 1026   SpO2 90 % 05/11/23 1026   Vitals shown include unvalidated device data.    Electronically Signed By: BARBARA Rivas CRNA  May 11, 2023  10:28 AM

## 2023-05-11 NOTE — OR NURSING
Patient received left side Adductor nerve block  with Exparel.  Fentanyl 50mcg given. Tolerated procedure well.    Homer Renae RN

## 2023-05-11 NOTE — INTERVAL H&P NOTE
"I have reviewed the surgical (or preoperative) H&P that is linked to this encounter, and examined the patient. There are no significant changes    Clinical Conditions Present on Arrival:  Clinically Significant Risk Factors Present on Admission                  # Overweight: Estimated body mass index is 27.45 kg/m  as calculated from the following:    Height as of this encounter: 1.676 m (5' 5.98\").    Weight as of this encounter: 77.1 kg (170 lb).       "

## 2023-05-12 ENCOUNTER — TELEPHONE (OUTPATIENT)
Dept: ORTHOPEDICS | Facility: CLINIC | Age: 68
End: 2023-05-12
Payer: MEDICARE

## 2023-05-12 NOTE — TELEPHONE ENCOUNTER
Writer called and talked with patient on the phone.Writer stated that Dr. Maldonado's team is unaware of the call. Pt thanked writer for call just wanted to make sure did not miss anything.     Josefina Jaffe LPN

## 2023-05-12 NOTE — TELEPHONE ENCOUNTER
Patient stated he received a call from [facility] earlier today, but no message left. He is assuming it would be regarding his surgery on 5/11 with Dr. Maldonado, but I could not see any notes in the chart mentioning reason for the call. Could you touch base with the patient  And let him know if this was someone following up after his surgery etc? Patient can be reached at [phone].    Thank you!

## 2023-05-15 ENCOUNTER — TELEPHONE (OUTPATIENT)
Dept: ORTHOPEDICS | Facility: CLINIC | Age: 68
End: 2023-05-15
Payer: MEDICARE

## 2023-05-15 NOTE — TELEPHONE ENCOUNTER
Health Call Center    Phone Message    May a detailed message be left on voicemail: no     Reason for Call: Requesting c/b- was told he could see a local provider for his first PO f/up and just wanted to make sure it is ok to cancel 05/22. He is schedule w/ the provider that referred him to Dr Maldonado on 05/18    Action Taken: Message routed to:  [facility] ([facility]    Travel Screening: Not Applicable

## 2023-05-15 NOTE — TELEPHONE ENCOUNTER
Patient was called back and he will see a provider locally for his first psot op an will follow up with Dr. Maldonado on 7/17/23. The appt on 5/22/23 was cancelled.  He had no other questions and was thankful for the call back.   with patient

## 2023-05-24 ENCOUNTER — DOCUMENTATION ONLY (OUTPATIENT)
Dept: ORTHOPEDICS | Facility: CLINIC | Age: 68
End: 2023-05-24
Payer: MEDICARE

## 2023-05-24 NOTE — PROGRESS NOTES
Received Completed forms Yes   Faxed Forms Faxed To: [facility]  Fax Number: [phone]   Sent to [facility] (Date) 5/23/23

## 2023-07-17 ENCOUNTER — OFFICE VISIT (OUTPATIENT)
Dept: ORTHOPEDICS | Facility: CLINIC | Age: 68
End: 2023-07-17
Payer: MEDICARE

## 2023-07-17 VITALS — WEIGHT: 170 LBS | BODY MASS INDEX: 27.32 KG/M2 | HEIGHT: 66 IN

## 2023-07-17 DIAGNOSIS — G89.29 CHRONIC PAIN OF LEFT KNEE: Primary | ICD-10-CM

## 2023-07-17 DIAGNOSIS — M25.562 CHRONIC PAIN OF LEFT KNEE: Primary | ICD-10-CM

## 2023-07-17 PROCEDURE — 99024 POSTOP FOLLOW-UP VISIT: CPT | Performed by: ORTHOPAEDIC SURGERY

## 2023-07-17 NOTE — PROGRESS NOTES
DIAGNOSIS:   1. Left Knee MCL, PCL tear    PROCEDURES  Date of surgery: 5/11/23  Type of surgery:   PROCEDURE:  1. Examination under anesthesia left knee  2. Left knee arthroscopy  3. Posterior cruciate ligament reconstruction with allograft  4. Medial collateral ligament reconstruction with allograft  5. Partial lateral meniscectomy    HISTORY:  Avery is 7-8 weeks status post the above procedures and is doing well. He presents using his rigid knee brace today. He says he does not use the brace around the house but does put it on while going outside or using stairs inside home.    Pain is minimal, he does not take any oral medications. Attends physical therapy.    EXAM:     General: Awake, Alert, and oriented. Articulates and communicates with a normal affect     Left Lower Extremity:    Incisions well healed without evidence of infection    Slight post-operative effusion compared to contralateral knee    Range of motion 5-107 degrees    Neurovascularly intact    IMAGING:  No imaging obtained today    ASSESSMENT:  1. 7weeks status post Left knee MCL, PCL reconstructions with allografts, partial lateral meniscectomy, doing very well overall.     PLAN:     Weightbearing: WBAT    Range of Motion: No range of motion restrictions    Pain Medications: We reviewed post-operative pain medications at today's visit. The patient has stopped all opioid pain medications and no further refills are required    Extension: We reviewed the importance of full knee extension and demonstrated the relevant exercises as appropriate to regain full knee extension    Crutches/Brace: Patient no longer requires the hinged knee brace or crutches and was advised to wean off the usage of the brace and crutches as tolerated.     Acitivity Restrictions:  ? Discussed that this is the dangerous time after reconstruction and patient should avoid running, lifting but would be ok for walking, swimming, biking  ? Reviewed activity restrictions at today's  visit     Follow up: 6 weeks virtual visit with no new radiographs needed

## 2023-07-17 NOTE — NURSING NOTE
"Reason For Visit:   Chief Complaint   Patient presents with     RECHECK     DOS: 5/11/23 left knee PCL reconstruction with allograft, MCL reconstruction with allograft     Date of surgery: 5/11/23  Type of surgery:   PROCEDURE:  1. Examination under anesthesia left knee  2. Left knee arthroscopy  3. Posterior cruciate ligament reconstruction with allograft  4. Medial collateral ligament reconstruction with allograft  5. Partial lateral meniscectomy  Manipulation under anesthesia      SANE Score  Left Knee: 60  Right Knee: 100    Pain Assessment  Patient Currently in Pain: Denies    Ht 1.676 m (5' 6\")   Wt 77.1 kg (170 lb)   BMI 27.44 kg/m           Allergies   Allergen Reactions     Penicillin G Rash       Current Outpatient Medications   Medication     atorvastatin (LIPITOR) 20 MG tablet     lisinopril (ZESTRIL) 20 MG tablet     acetaminophen (TYLENOL) 325 MG tablet     ondansetron (ZOFRAN ODT) 4 MG ODT tab     oxyCODONE (ROXICODONE) 5 MG tablet     senna-docusate (SENOKOT-S/PERICOLACE) 8.6-50 MG tablet     No current facility-administered medications for this visit.         Renu South, ATC    "

## 2023-07-17 NOTE — LETTER
7/17/2023         RE: Avery Anderson  [address]  [facility] [address]        Dear Colleague,    Thank you for referring your patient, Avery Anderson, to the [facility] [place]. Please see a copy of my visit note below.    DIAGNOSIS:   Left Knee MCL, PCL tear    PROCEDURES  Date of surgery: 5/11/23  Type of surgery:   PROCEDURE:  Examination under anesthesia left knee  Left knee arthroscopy  Posterior cruciate ligament reconstruction with allograft  Medial collateral ligament reconstruction with allograft  Partial lateral meniscectomy    HISTORY:  Avery is 7-8 weeks status post the above procedures and is doing well. He presents using his rigid knee brace today. He says he does not use the brace around the house but does put it on while going outside or using stairs inside home.    Pain is minimal, he does not take any oral medications. Attends physical therapy.    EXAM:     General: Awake, Alert, and oriented. Articulates and communicates with a normal affect     Left Lower Extremity:  Incisions well healed without evidence of infection  Slight post-operative effusion compared to contralateral knee  Range of motion 5-107 degrees  Neurovascularly intact    IMAGING:  No imaging obtained today    ASSESSMENT:  7weeks status post Left knee MCL, PCL reconstructions with allografts, partial lateral meniscectomy, doing very well overall.     PLAN:   Weightbearing: WBAT  Range of Motion: No range of motion restrictions  Pain Medications: We reviewed post-operative pain medications at today's visit. The patient has stopped all opioid pain medications and no further refills are required  Extension: We reviewed the importance of full knee extension and demonstrated the relevant exercises as appropriate to regain full knee extension  Crutches/Brace: Patient no longer requires the hinged knee brace or crutches and was advised to wean off the usage of the brace and crutches as tolerated.   Acitivity  Restrictions:  Discussed that this is the dangerous time after reconstruction and patient should avoid running, lifting but would be ok for walking, swimming, biking  Reviewed activity restrictions at today's visit     Follow up: 6 weeks virtual visit with no new radiographs needed      Again, thank you for allowing me to participate in the care of your patient.        Sincerely,        Leroy Maldonado MD

## 2023-08-14 ENCOUNTER — DOCUMENTATION ONLY (OUTPATIENT)
Dept: ORTHOPEDICS | Facility: CLINIC | Age: 68
End: 2023-08-14
Payer: MEDICARE

## 2023-08-14 ENCOUNTER — TRANSFERRED RECORDS (OUTPATIENT)
Dept: HEALTH INFORMATION MANAGEMENT | Facility: CLINIC | Age: 68
End: 2023-08-14
Payer: MEDICARE

## 2023-08-14 NOTE — PROGRESS NOTES
Received Completed forms Yes   Faxed Forms Faxed To: [facility]  Fax Number: [phone]   Sent to [facility] (Date) 8/14/23

## 2024-07-13 ENCOUNTER — HEALTH MAINTENANCE LETTER (OUTPATIENT)
Age: 69
End: 2024-07-13

## 2025-07-19 ENCOUNTER — HEALTH MAINTENANCE LETTER (OUTPATIENT)
Age: 70
End: 2025-07-19

## (undated) DEVICE — LINEN ORTHO PACK 5446

## (undated) DEVICE — SU VICRYL 0 CT-1 27" UND J260H

## (undated) DEVICE — GLOVE BIOGEL PI MICRO INDICATOR UNDERGLOVE SZ 8.0 48980

## (undated) DEVICE — LINEN TOWEL PACK X5 5464

## (undated) DEVICE — PIN DRILL ARTHREX ACL TIGHTROPE CLOSED EYELET 4MM AR-1595TC

## (undated) DEVICE — PACK ARTHROSCOPY CUSTOM ASC

## (undated) DEVICE — ARTHROSCOPIC CANNULA PART THRD PURPLE 7MMX7CM AR-6567

## (undated) DEVICE — DRAPE C-ARM W/STRAPS 42X72" 07-CA104

## (undated) DEVICE — SU ETHILON 3-0 PS-1 18" 1663H

## (undated) DEVICE — ESU PENCIL SMOKE EVAC W/ROCKER SWITCH 0703-047-000

## (undated) DEVICE — PREP CHLORAPREP 26ML TINTED ORANGE  260815

## (undated) DEVICE — SU MONOCRYL 3-0 PS-1 27" Y936H

## (undated) DEVICE — SU TIGERSTICK #2 TIGERWIRE 50" STIFF END 12" AR-7209T

## (undated) DEVICE — SU FIBERWIRE 2 38"  AR-7200

## (undated) DEVICE — SUCTION MANIFOLD NEPTUNE 2 SYS 4 PORT 0702-020-000

## (undated) DEVICE — ABLATOR ARTHREX APOLLO RF MP90 ASPIRATING 90DEG AR-9811

## (undated) DEVICE — TUBING SYSTEM ARTHREX PATIENT REDEUCE AR-6421

## (undated) DEVICE — BI-CORTICAL POST
Type: IMPLANTABLE DEVICE | Site: KNEE | Status: NON-FUNCTIONAL
Brand: ARTHREX®

## (undated) DEVICE — BUR ARTHREX COOLCUT SABRE 4.0MMX13CM AR-8400SR

## (undated) DEVICE — BNDG ELASTIC 6" DBL LENGTH UNSTERILE 6611-16

## (undated) DEVICE — SU ETHIBOND 1 CT-1 30" X425H

## (undated) DEVICE — PACK ACL SUPPLEMENT CUSTOM ASC

## (undated) DEVICE — SU VICRYL 2-0 CT-1 27" UND J259H

## (undated) DEVICE — WASHER, SPIKED
Type: IMPLANTABLE DEVICE | Site: KNEE | Status: NON-FUNCTIONAL
Brand: ARTHREX®

## (undated) DEVICE — DRAPE TIBURON TOP SHEET 100X60" 29352

## (undated) DEVICE — DRSG STERI STRIP 1/2X4" R1547

## (undated) DEVICE — PIN GUIDE ARTHREX 2.4MM DRILL  AR-1250L

## (undated) DEVICE — PEN MARKING SKIN W/PAPER RULER 31145785

## (undated) DEVICE — ESU GROUND PAD ADULT W/CORD E7507

## (undated) DEVICE — PAD ARMBOARD FOAM EGGCRATE COVIDEN 3114367

## (undated) DEVICE — Device

## (undated) DEVICE — 3.2MM DRILL BIT

## (undated) DEVICE — PIN GUIDE ARTHREX 2.4MM W/EYE BEATH PIN AR-1297L

## (undated) DEVICE — SU FIBERWIRE #2 FIBERSTICK 50"  AR-7209

## (undated) DEVICE — TUBING SUCTION MEDI-VAC 1/4"X20' N620A

## (undated) DEVICE — GLOVE BIOGEL PI MICRO SZ 8.0 48580

## (undated) DEVICE — SOL NACL 0.9% IRRIG 3000ML BAG 2B7477

## (undated) DEVICE — SU VICRYL 0 CT 36" J358H

## (undated) RX ORDER — OXYCODONE HYDROCHLORIDE 5 MG/1
TABLET ORAL
Status: DISPENSED
Start: 2023-05-11

## (undated) RX ORDER — DEXMEDETOMIDINE HYDROCHLORIDE 4 UG/ML
INJECTION, SOLUTION INTRAVENOUS
Status: DISPENSED
Start: 2023-05-11

## (undated) RX ORDER — FENTANYL CITRATE-0.9 % NACL/PF 10 MCG/ML
PLASTIC BAG, INJECTION (ML) INTRAVENOUS
Status: DISPENSED
Start: 2023-05-11

## (undated) RX ORDER — HYDROMORPHONE HYDROCHLORIDE 1 MG/ML
INJECTION, SOLUTION INTRAMUSCULAR; INTRAVENOUS; SUBCUTANEOUS
Status: DISPENSED
Start: 2023-05-11

## (undated) RX ORDER — ACETAMINOPHEN 325 MG/1
TABLET ORAL
Status: DISPENSED
Start: 2023-05-11

## (undated) RX ORDER — KETOROLAC TROMETHAMINE 30 MG/ML
INJECTION, SOLUTION INTRAMUSCULAR; INTRAVENOUS
Status: DISPENSED
Start: 2023-05-11

## (undated) RX ORDER — CEFAZOLIN SODIUM 2 G/50ML
SOLUTION INTRAVENOUS
Status: DISPENSED
Start: 2023-05-11

## (undated) RX ORDER — DEXAMETHASONE SODIUM PHOSPHATE 4 MG/ML
INJECTION, SOLUTION INTRA-ARTICULAR; INTRALESIONAL; INTRAMUSCULAR; INTRAVENOUS; SOFT TISSUE
Status: DISPENSED
Start: 2023-05-11

## (undated) RX ORDER — PROPOFOL 10 MG/ML
INJECTION, EMULSION INTRAVENOUS
Status: DISPENSED
Start: 2023-05-11

## (undated) RX ORDER — GLYCOPYRROLATE 0.2 MG/ML
INJECTION INTRAMUSCULAR; INTRAVENOUS
Status: DISPENSED
Start: 2023-05-11

## (undated) RX ORDER — ONDANSETRON 2 MG/ML
INJECTION INTRAMUSCULAR; INTRAVENOUS
Status: DISPENSED
Start: 2023-05-11

## (undated) RX ORDER — EPINEPHRINE 1 MG/ML
INJECTION, SOLUTION INTRAMUSCULAR; SUBCUTANEOUS
Status: DISPENSED
Start: 2023-05-11

## (undated) RX ORDER — FENTANYL CITRATE 50 UG/ML
INJECTION, SOLUTION INTRAMUSCULAR; INTRAVENOUS
Status: DISPENSED
Start: 2023-05-11

## (undated) RX ORDER — VANCOMYCIN HYDROCHLORIDE 1 G/20ML
INJECTION, POWDER, LYOPHILIZED, FOR SOLUTION INTRAVENOUS
Status: DISPENSED
Start: 2023-05-11

## (undated) RX ORDER — BUPIVACAINE HYDROCHLORIDE 2.5 MG/ML
INJECTION, SOLUTION EPIDURAL; INFILTRATION; INTRACAUDAL
Status: DISPENSED
Start: 2023-05-11